# Patient Record
Sex: FEMALE | Race: OTHER | HISPANIC OR LATINO | URBAN - METROPOLITAN AREA
[De-identification: names, ages, dates, MRNs, and addresses within clinical notes are randomized per-mention and may not be internally consistent; named-entity substitution may affect disease eponyms.]

---

## 2023-01-13 ENCOUNTER — EMERGENCY (EMERGENCY)
Facility: HOSPITAL | Age: 53
LOS: 1 days | Discharge: HOME | End: 2023-01-13
Attending: EMERGENCY MEDICINE
Payer: MEDICAID

## 2023-01-13 VITALS
SYSTOLIC BLOOD PRESSURE: 134 MMHG | RESPIRATION RATE: 17 BRPM | TEMPERATURE: 98 F | DIASTOLIC BLOOD PRESSURE: 74 MMHG | HEART RATE: 69 BPM | OXYGEN SATURATION: 98 % | WEIGHT: 127.43 LBS

## 2023-01-13 VITALS
OXYGEN SATURATION: 97 % | TEMPERATURE: 99 F | RESPIRATION RATE: 18 BRPM | DIASTOLIC BLOOD PRESSURE: 73 MMHG | SYSTOLIC BLOOD PRESSURE: 123 MMHG | HEART RATE: 61 BPM

## 2023-01-13 LAB
ALBUMIN SERPL ELPH-MCNC: 3.6 G/DL — SIGNIFICANT CHANGE UP (ref 3.5–5)
ALP SERPL-CCNC: 115 U/L — SIGNIFICANT CHANGE UP (ref 40–120)
ALT FLD-CCNC: 58 U/L DA — SIGNIFICANT CHANGE UP (ref 10–60)
ANION GAP SERPL CALC-SCNC: 4 MMOL/L — LOW (ref 5–17)
APPEARANCE UR: CLEAR — SIGNIFICANT CHANGE UP
AST SERPL-CCNC: 28 U/L — SIGNIFICANT CHANGE UP (ref 10–40)
BASOPHILS # BLD AUTO: 0.02 K/UL — SIGNIFICANT CHANGE UP (ref 0–0.2)
BASOPHILS NFR BLD AUTO: 0.3 % — SIGNIFICANT CHANGE UP (ref 0–2)
BILIRUB SERPL-MCNC: 0.2 MG/DL — SIGNIFICANT CHANGE UP (ref 0.2–1.2)
BILIRUB UR-MCNC: NEGATIVE — SIGNIFICANT CHANGE UP
BUN SERPL-MCNC: 20 MG/DL — HIGH (ref 7–18)
CALCIUM SERPL-MCNC: 8.9 MG/DL — SIGNIFICANT CHANGE UP (ref 8.4–10.5)
CHLORIDE SERPL-SCNC: 109 MMOL/L — HIGH (ref 96–108)
CO2 SERPL-SCNC: 27 MMOL/L — SIGNIFICANT CHANGE UP (ref 22–31)
COLOR SPEC: YELLOW — SIGNIFICANT CHANGE UP
CREAT SERPL-MCNC: 0.79 MG/DL — SIGNIFICANT CHANGE UP (ref 0.5–1.3)
DIFF PNL FLD: ABNORMAL
EGFR: 91 ML/MIN/1.73M2 — SIGNIFICANT CHANGE UP
EOSINOPHIL # BLD AUTO: 0.09 K/UL — SIGNIFICANT CHANGE UP (ref 0–0.5)
EOSINOPHIL NFR BLD AUTO: 1.5 % — SIGNIFICANT CHANGE UP (ref 0–6)
FLUAV AG NPH QL: SIGNIFICANT CHANGE UP
FLUBV AG NPH QL: SIGNIFICANT CHANGE UP
GLUCOSE SERPL-MCNC: 110 MG/DL — HIGH (ref 70–99)
GLUCOSE UR QL: NEGATIVE — SIGNIFICANT CHANGE UP
HCG SERPL-ACNC: 1 MIU/ML — SIGNIFICANT CHANGE UP
HCT VFR BLD CALC: 38.3 % — SIGNIFICANT CHANGE UP (ref 34.5–45)
HGB BLD-MCNC: 12.2 G/DL — SIGNIFICANT CHANGE UP (ref 11.5–15.5)
IMM GRANULOCYTES NFR BLD AUTO: 0.2 % — SIGNIFICANT CHANGE UP (ref 0–0.9)
KETONES UR-MCNC: NEGATIVE — SIGNIFICANT CHANGE UP
LEUKOCYTE ESTERASE UR-ACNC: ABNORMAL
LYMPHOCYTES # BLD AUTO: 2.2 K/UL — SIGNIFICANT CHANGE UP (ref 1–3.3)
LYMPHOCYTES # BLD AUTO: 37 % — SIGNIFICANT CHANGE UP (ref 13–44)
MAGNESIUM SERPL-MCNC: 2.5 MG/DL — SIGNIFICANT CHANGE UP (ref 1.6–2.6)
MCHC RBC-ENTMCNC: 26.1 PG — LOW (ref 27–34)
MCHC RBC-ENTMCNC: 31.9 GM/DL — LOW (ref 32–36)
MCV RBC AUTO: 82 FL — SIGNIFICANT CHANGE UP (ref 80–100)
MONOCYTES # BLD AUTO: 0.5 K/UL — SIGNIFICANT CHANGE UP (ref 0–0.9)
MONOCYTES NFR BLD AUTO: 8.4 % — SIGNIFICANT CHANGE UP (ref 2–14)
NEUTROPHILS # BLD AUTO: 3.12 K/UL — SIGNIFICANT CHANGE UP (ref 1.8–7.4)
NEUTROPHILS NFR BLD AUTO: 52.6 % — SIGNIFICANT CHANGE UP (ref 43–77)
NITRITE UR-MCNC: NEGATIVE — SIGNIFICANT CHANGE UP
NRBC # BLD: 0 /100 WBCS — SIGNIFICANT CHANGE UP (ref 0–0)
PH UR: 7 — SIGNIFICANT CHANGE UP (ref 5–8)
PLATELET # BLD AUTO: 318 K/UL — SIGNIFICANT CHANGE UP (ref 150–400)
POTASSIUM SERPL-MCNC: 3.9 MMOL/L — SIGNIFICANT CHANGE UP (ref 3.5–5.3)
POTASSIUM SERPL-SCNC: 3.9 MMOL/L — SIGNIFICANT CHANGE UP (ref 3.5–5.3)
PROT SERPL-MCNC: 7.5 G/DL — SIGNIFICANT CHANGE UP (ref 6–8.3)
PROT UR-MCNC: 15
RBC # BLD: 4.67 M/UL — SIGNIFICANT CHANGE UP (ref 3.8–5.2)
RBC # FLD: 13.6 % — SIGNIFICANT CHANGE UP (ref 10.3–14.5)
SARS-COV-2 RNA SPEC QL NAA+PROBE: SIGNIFICANT CHANGE UP
SODIUM SERPL-SCNC: 140 MMOL/L — SIGNIFICANT CHANGE UP (ref 135–145)
SP GR SPEC: 1.01 — SIGNIFICANT CHANGE UP (ref 1.01–1.02)
T4 AB SER-ACNC: 7.9 UG/DL — SIGNIFICANT CHANGE UP (ref 4.6–12)
TROPONIN I, HIGH SENSITIVITY RESULT: 5.3 NG/L — SIGNIFICANT CHANGE UP
TSH SERPL-MCNC: 2.08 UU/ML — SIGNIFICANT CHANGE UP (ref 0.34–4.82)
UROBILINOGEN FLD QL: NEGATIVE — SIGNIFICANT CHANGE UP
WBC # BLD: 5.94 K/UL — SIGNIFICANT CHANGE UP (ref 3.8–10.5)
WBC # FLD AUTO: 5.94 K/UL — SIGNIFICANT CHANGE UP (ref 3.8–10.5)

## 2023-01-13 PROCEDURE — 70450 CT HEAD/BRAIN W/O DYE: CPT | Mod: 26,MG

## 2023-01-13 PROCEDURE — 87637 SARSCOV2&INF A&B&RSV AMP PRB: CPT

## 2023-01-13 PROCEDURE — 84702 CHORIONIC GONADOTROPIN TEST: CPT

## 2023-01-13 PROCEDURE — 96375 TX/PRO/DX INJ NEW DRUG ADDON: CPT

## 2023-01-13 PROCEDURE — 81001 URINALYSIS AUTO W/SCOPE: CPT

## 2023-01-13 PROCEDURE — 83735 ASSAY OF MAGNESIUM: CPT

## 2023-01-13 PROCEDURE — G1004: CPT

## 2023-01-13 PROCEDURE — 80053 COMPREHEN METABOLIC PANEL: CPT

## 2023-01-13 PROCEDURE — 84443 ASSAY THYROID STIM HORMONE: CPT

## 2023-01-13 PROCEDURE — 99284 EMERGENCY DEPT VISIT MOD MDM: CPT

## 2023-01-13 PROCEDURE — 36415 COLL VENOUS BLD VENIPUNCTURE: CPT

## 2023-01-13 PROCEDURE — 84436 ASSAY OF TOTAL THYROXINE: CPT

## 2023-01-13 PROCEDURE — 99284 EMERGENCY DEPT VISIT MOD MDM: CPT | Mod: 25

## 2023-01-13 PROCEDURE — 96374 THER/PROPH/DIAG INJ IV PUSH: CPT

## 2023-01-13 PROCEDURE — 85025 COMPLETE CBC W/AUTO DIFF WBC: CPT

## 2023-01-13 PROCEDURE — 84484 ASSAY OF TROPONIN QUANT: CPT

## 2023-01-13 PROCEDURE — 70450 CT HEAD/BRAIN W/O DYE: CPT | Mod: MG

## 2023-01-13 RX ORDER — KETOROLAC TROMETHAMINE 30 MG/ML
30 SYRINGE (ML) INJECTION ONCE
Refills: 0 | Status: DISCONTINUED | OUTPATIENT
Start: 2023-01-13 | End: 2023-01-13

## 2023-01-13 RX ORDER — ONDANSETRON 8 MG/1
4 TABLET, FILM COATED ORAL ONCE
Refills: 0 | Status: COMPLETED | OUTPATIENT
Start: 2023-01-13 | End: 2023-01-13

## 2023-01-13 RX ORDER — SODIUM CHLORIDE 9 MG/ML
1000 INJECTION INTRAMUSCULAR; INTRAVENOUS; SUBCUTANEOUS ONCE
Refills: 0 | Status: COMPLETED | OUTPATIENT
Start: 2023-01-13 | End: 2023-01-13

## 2023-01-13 RX ADMIN — SODIUM CHLORIDE 1000 MILLILITER(S): 9 INJECTION INTRAMUSCULAR; INTRAVENOUS; SUBCUTANEOUS at 17:28

## 2023-01-13 RX ADMIN — Medication 30 MILLIGRAM(S): at 17:28

## 2023-01-13 RX ADMIN — ONDANSETRON 4 MILLIGRAM(S): 8 TABLET, FILM COATED ORAL at 17:28

## 2023-01-13 NOTE — ED PROVIDER NOTE - NSFOLLOWUPINSTRUCTIONS_ED_ALL_ED_FT
Sinusitis, en adultos    Sinusitis, Adult       La sinusitis es la inflamación de los senos paranasales. Los senos paranasales son espacios vacíos en los huesos alrededor del albert. Los senos paranasales se encuentran en estos lugares:  •Alrededor de los ojos.      •En la mitad de la frente.      •Detrás de la nariz.      •En los pómulos.      Normalmente, la mucosidad drena a través de los senos. Cuando los tejidos nasales se inflaman o hinchan, la mucosidad puede quedar atrapada o bloqueada. Crest fomenta la proliferación de bacterias, virus y hongos, lo que produce infecciones. La mayoría de las infecciones de los senos paranasales son provocadas por un virus.    La sinusitis puede desarrollarse rápidamente. Puede durar hasta 4 semanas (aguda) o más de 12 semanas (crónica). A menudo, la sinusitis surge después de un resfriado.      ¿Cuáles son las causas?    Esta afección es causada por cualquier sustancia que inflame los senos o evite que la mucosidad drene. Crest puede comprender lo siguiente:  •Alergias.      •Asma.      •Infección por bacterias o virus.      •Deformidades u obstrucciones en la nariz o los senos paranasales.      •Crecimientos anormales en la nariz (pólipos nasales).      •Agentes contaminantes, dariusz sustancias químicas o irritantes presentes en el aire.      •Infección por hongos (poco frecuentes).        ¿Qué incrementa el riesgo?    Es más probable que tenga esta afección si:  •Tienen debilitado el sistema de defensa del organismo (sistema inmunitario).      •Nada o bucea mucho.      •Abusa de los aerosoles nasales.      •Fuma.        ¿Cuáles son los signos o los síntomas?    Los principales síntomas de esta afección son dolor y sensación de presión alrededor de los senos paranasales afectados. Otros síntomas pueden incluir los siguientes:  •Nariz tapada o congestión nasal.      •Drenaje de mucosidad espesa que sale de la nariz.      •Hinchazón y calor en los senos paranasales afectados.      •Dolor de geraldo.      •Dolor en los dientes superiores.      •Tos que puede empeorar por la noche.      •Mucosidad excesiva que se acumula en la garganta o la parte posterior de la nariz (goteo posnasal).      •Disminución del sentido del olfato y del gusto.      •Fatiga.      •Fiebre.      •Dolor de garganta.      •Mal aliento.        ¿Cómo se diagnostica?    Esta afección se diagnostica en función de lo siguiente:  •Alejandra síntomas.      •Alejandra antecedentes médicos.      •Un examen físico.    •Pruebas para averiguar si la afección es aguda o crónica. Estas pueden incluir:  •Revisar la nariz para richard si tiene pólipos nasales.      •Observar los senos paranasales con un dispositivo que tiene vivek yaakov (endoscopio).      •Hacer pruebas para detectar alergias o bacterias.      •Pruebas de diagnóstico por imágenes, dariusz resonancia magnética (RM) o vivek exploración por tomografía computarizada (TC).        En contadas ocasiones, se puede realizar vivek biopsia de hueso para descartar tipos más graves de infecciones por hongos en los senos paranasales.      ¿Cómo se trata?    El tratamiento para la sinusitis depende de la causa y de si la afección es crónica o aguda.•Si la causa es un virus, los síntomas deberían desaparecer solos en el término de 10 días. Pueden darle medicamentos para aliviar los síntomas. Entre ellos, se incluyen los siguientes:  •Medicamentos para encoger las fosas nasales hinchadas (descongestivos intranasales tópicos).       •Medicamentos para tratar alergias (antihistamínicos).      •Un aerosol que rajesh la inflamación de las fosas nasales (corticoesteroide intranasal tópico).      •Enjuagues que ayudan a eliminar la mucosidad espesa de la nariz (lavados con solución salina nasal).      •Si la causa son bacterias, el médico puede recomendarle que espere para richard si los síntomas mejoran. La mayoría de las infecciones bacterianas mejoran sin medicamentos antibióticos. Posiblemente le den antibióticos si usted tiene:  •Vivek infección grave.       •El sistema inmunitario debilitado.        •Si la causa es un estrechamiento de las fosas nasales o la presencia de pólipos nasales, es posible que necesite vivek cirugía.        Siga estas indicaciones en good casa:    Medicamentos     •[place], use o aplíquese los medicamentos de venta bri y recetados solamente dariusz se lo haya indicado el médico. Estos pueden incluir aerosoles nasales.      •Si le recetaron un antibiótico, tómelo dariusz se lo haya indicado el médico. No deje de alicia los antibióticos aunque comience a sentirse mejor.        Hidrátese y humidifique los ambientes      •[place] suficiente líquido dariusz para mantener la orina de color amarillo pálido. Mantenerse hidratado lo ayudará a diluir la mucosidad.      •Use un humidificador de vapor frío para mantener la humedad de good hogar por encima del 50 %.      •Realice inhalaciones de vapor por 10 a 15 minutos, de 3 a 4 veces al día, o dariusz se lo haya indicado el médico. Puede hacer esto en el baño con el vapor del [place] de la ducha.      •Limite la exposición al aire frío o seco.      Reposo     •Descanse todo lo que pueda.      •Duerma con la geraldo levantada (elevada).      •Asegúrese de dormir lo suficiente cada noche.        Indicaciones generales      •Aplíquese un paño tibio y húmedo en la christine 3 o 4 veces al día o dariusz se lo haya indicado el médico. Crest ayuda a calmar las molestias.      •Lávese las angela frecuentemente con agua y jabón para reducir la exposición a los gérmenes. Use desinfectante para angela si no dispone de agua y jabón.      • No fume. Evite estar cerca de personas que fuman (fumador pasivo).      •Concurra a todas las visitas de seguimiento dariusz se lo haya indicado el médico. Crest es importante.        Comuníquese con un médico si:    •Tiene fiebre.      •Alejandra síntomas empeoran.      •Los síntomas no mejoran en el término de 10 días.        Solicite ayuda inmediatamente si:    •Tiene un dolor de geraldo intenso.      •Tiene vómitos persistentes.      •Tiene dolor intenso o hinchazón en la uri del albert o los ojos.      •Tiene problemas de visión.      •Presenta confusión.      •Tiene el robert rígido.      •Tiene dificultad para respirar.        Resumen    •La sinusitis es el dolor y la inflamación de los senos paranasales. Los senos paranasales son espacios vacíos en los huesos alrededor del albert.      •La causa de esta afección es la inflamación o hinchazón de los tejidos nasales. La hinchazón atrapa u obstruye el flujo de la mucosidad. Crest fomenta la proliferación de bacterias, virus y hongos, lo que produce infecciones.      •Si le recetaron un antibiótico, tómelo dariusz se lo haya indicado el médico. No deje de alicia los antibióticos, aunque comience a sentirse mejor.      •Concurra a todas las visitas de seguimiento dariusz se lo haya indicado el médico. Crest es importante.      Esta información no tiene dariusz fin reemplazar el consejo del médico. Asegúrese de hacerle al médico cualquier pregunta que tenga.      Document Revised: 07/02/2019 Document Reviewed: 07/02/2019    Elsevier Patient Education © 2022 [employer]

## 2023-01-13 NOTE — ED PROVIDER NOTE - CLINICAL SUMMARY MEDICAL DECISION MAKING FREE TEXT BOX
Patient with history of flu 2 weeks ago presents to ED with weakness and fatigue.  Concern for infectious process, UTI, pneumonia, COVID vs flu.  Will check labs, UA, viral swab, and reassess.

## 2023-01-13 NOTE — ED PROVIDER NOTE - OBJECTIVE STATEMENT
Cuban speaking.   number  [id number].  Patient with past medical history of hypothyroid presents with weakness, dizziness, and fatigue.  As per patient she had the flu 2 weeks ago.  her cough has improved and so has her fever.  Patient however still feels weak and fatigued.  Today she began to feel dizzy, described as feeling weak and lightheaded. Patient reporting dry cough, no vomiting, no diarrhea, no urinary complaints.

## 2023-01-13 NOTE — ED PROVIDER NOTE - PATIENT PORTAL LINK FT
You can access the FollowMyHealth Patient Portal offered by [facility] by registering at the following website: [email or web address]. By joining [facility]’s FollowMyHealth portal, you will also be able to view your health information using other applications (apps) compatible with our system.

## 2023-01-13 NOTE — ED ADULT NURSE NOTE - OBJECTIVE STATEMENT
Pt presents to the ED c/o weakness and dizziness. PMH hypothyroidism. As per pt she was diagnosed with the flu 2weeks ago and since Monday she has been feeling weak and dizzy. pt is OAx4. breathing unlabored on RA symmetrical rise and fall of the chest. Abdomen is soft and nondistended. Skin is warm and dry to touch. Pt denies any CP, SOB, fever, chills, N/V/D, urinary symptoms. On stretcher at lowest position.